# Patient Record
Sex: FEMALE | HISPANIC OR LATINO | URBAN - METROPOLITAN AREA
[De-identification: names, ages, dates, MRNs, and addresses within clinical notes are randomized per-mention and may not be internally consistent; named-entity substitution may affect disease eponyms.]

---

## 2018-12-14 ENCOUNTER — OFFICE VISIT (OUTPATIENT)
Dept: URBAN - METROPOLITAN AREA CLINIC 23 | Facility: CLINIC | Age: 58
End: 2018-12-14
Payer: COMMERCIAL

## 2018-12-14 PROCEDURE — 92014 COMPRE OPH EXAM EST PT 1/>: CPT | Performed by: OPHTHALMOLOGY

## 2018-12-14 PROCEDURE — 92134 CPTRZ OPH DX IMG PST SGM RTA: CPT | Performed by: OPHTHALMOLOGY

## 2018-12-14 ASSESSMENT — INTRAOCULAR PRESSURE
OS: 16
OD: 16

## 2018-12-14 NOTE — IMPRESSION/PLAN
Impression: Ophthalmoplegic migraine, intractable: G43. B1. Vision: affected OU. Plan: Discussed diagnosis in detail with patient. Patient complaints of migraines - increasing. Recommend a Visual Field test 30 - 2 to r/o any peripheral field changes or defects then see me for test results.  If no change or improvement with symptoms, may consider a consult with Neurologist.

## 2018-12-14 NOTE — IMPRESSION/PLAN
Impression: s/p PCA for repair of Horseshoe tear of retina without detachment, right eye 09/30/2016: H33.311. Condition: resolved with laser tx OD. Plan: Discussed diagnosis in detail with patient. Exam and OCT OD shows the retina is attached and stable. No treatment is required at this time. Will continue to observe condition and or symptoms. OCT OS is also stable.

## 2018-12-20 ENCOUNTER — OFFICE VISIT (OUTPATIENT)
Dept: URBAN - METROPOLITAN AREA CLINIC 17 | Facility: CLINIC | Age: 58
End: 2018-12-20
Payer: COMMERCIAL

## 2018-12-20 PROCEDURE — 92083 EXTENDED VISUAL FIELD XM: CPT | Performed by: OPHTHALMOLOGY

## 2018-12-20 PROCEDURE — 92134 CPTRZ OPH DX IMG PST SGM RTA: CPT | Performed by: OPHTHALMOLOGY

## 2018-12-20 PROCEDURE — 99213 OFFICE O/P EST LOW 20 MIN: CPT | Performed by: OPHTHALMOLOGY

## 2018-12-20 ASSESSMENT — INTRAOCULAR PRESSURE
OS: 15
OD: 15

## 2018-12-20 NOTE — IMPRESSION/PLAN
Impression: Ophthalmoplegic migraine, intractable: G43. B1. Vision: affected OU. Plan: Discussed diagnosis in detail with patient. VF results: normal OU and OCT is stable OU. Patient states she still experiencing migraines. Recommend a consult with Neurologist for further testing. Recommend a retina f/u in 9 mos.

## 2019-12-17 ENCOUNTER — OFFICE VISIT (OUTPATIENT)
Dept: URBAN - METROPOLITAN AREA CLINIC 23 | Facility: CLINIC | Age: 59
End: 2019-12-17
Payer: COMMERCIAL

## 2019-12-17 PROCEDURE — 99213 OFFICE O/P EST LOW 20 MIN: CPT | Performed by: OPHTHALMOLOGY

## 2019-12-17 PROCEDURE — 92134 CPTRZ OPH DX IMG PST SGM RTA: CPT | Performed by: OPHTHALMOLOGY

## 2019-12-17 ASSESSMENT — INTRAOCULAR PRESSURE
OS: 15
OD: 15

## 2019-12-17 NOTE — IMPRESSION/PLAN
Impression: s/p PCA for repair of Horseshoe tear of retina without detachment, right eye 09/30/2016: H33.311. Condition: resolved with laser tx OD. Resolved Ocular migraines Plan: Discussed diagnosis in detail with patient. Exam and OCT OD shows the retina is attached and stable. No treatment is required at this time. Will continue to observe condition and or symptoms. OCT OS is also stable. Recommend a yearly retina exam, sooner if there is a change or decrease in vision. 
Patient states she has not had any Ocular Migraine episodes, she also saw a Neurologist and test were normal.

## 2020-11-05 ENCOUNTER — OFFICE VISIT (OUTPATIENT)
Dept: URBAN - METROPOLITAN AREA CLINIC 17 | Facility: CLINIC | Age: 60
End: 2020-11-05
Payer: COMMERCIAL

## 2020-11-05 DIAGNOSIS — H35.372 PUCKERING OF MACULA, LEFT EYE: Primary | ICD-10-CM

## 2020-11-05 DIAGNOSIS — H43.811 VITREOUS DEGENERATION, RIGHT EYE: ICD-10-CM

## 2020-11-05 DIAGNOSIS — H04.123 DRY EYE SYNDROME OF BILATERAL LACRIMAL GLANDS: ICD-10-CM

## 2020-11-05 DIAGNOSIS — H25.13 AGE-RELATED NUCLEAR CATARACT, BILATERAL: ICD-10-CM

## 2020-11-05 PROCEDURE — 92004 COMPRE OPH EXAM NEW PT 1/>: CPT | Performed by: OPTOMETRIST

## 2020-11-05 ASSESSMENT — INTRAOCULAR PRESSURE
OD: 16
OS: 16

## 2020-11-05 NOTE — IMPRESSION/PLAN
Impression: s/p PCA for repair of Horseshoe tear of retina without detachment, right eye 09/30/2016: H33.311. Condition: resolved with laser tx OD. Resolved Ocular migraines Plan: Discussed diagnosis in detail with patient. Exam and OCT OD shows the retina is attached and stable. No treatment is required at this time. Will continue to observe condition and or symptoms. OCT OS is also stable. Recommend a yearly retina exam, sooner if there is a change or decrease in vision. Keep upcoming appointment

## 2020-11-05 NOTE — IMPRESSION/PLAN
Impression: Puckering of macula, left eye: H35.372. Plan: Discussed diagnosis in detail with patient. Discussed treatment options with patient. Will continue to observe condition and or symptoms. No treatment is required at this time.

## 2020-12-29 ENCOUNTER — OFFICE VISIT (OUTPATIENT)
Dept: URBAN - METROPOLITAN AREA CLINIC 17 | Facility: CLINIC | Age: 60
End: 2020-12-29
Payer: COMMERCIAL

## 2020-12-29 DIAGNOSIS — H33.311 HORSESHOE TEAR OF RETINA WITHOUT DETACHMENT, RIGHT EYE: Primary | ICD-10-CM

## 2020-12-29 PROCEDURE — 92134 CPTRZ OPH DX IMG PST SGM RTA: CPT | Performed by: OPHTHALMOLOGY

## 2020-12-29 PROCEDURE — 99213 OFFICE O/P EST LOW 20 MIN: CPT | Performed by: OPHTHALMOLOGY

## 2020-12-29 ASSESSMENT — INTRAOCULAR PRESSURE
OS: 18
OD: 18

## 2020-12-29 NOTE — IMPRESSION/PLAN
Impression: s/p PCA for repair of Horseshoe tear of retina without detachment, right eye 09/30/2016: H33.311. Condition: resolved with laser tx OD. Resolved Ocular migraines Plan: Discussed diagnosis in detail with patient. Exam and OCT OD shows the retina is attached and stable. No treatment is required at this time. Will continue to observe condition and or symptoms. OCT OS is also stable. Recommend a yearly retina exam, sooner if there is a change or decrease in vision.

## 2020-12-29 NOTE — IMPRESSION/PLAN
Impression: Ophthalmoplegic migraine, intractable: G43. B1. Vision: affected OU. Bilateral. Plan: Patient states she is having Ocular Migraine episodes once and awhile, she also saw a Neurologist and test were normal. Patient's last HVF 30-2 was in 2018 will order another HVF 30-2 to reassess her visual field and rule out any neurological conditions.

## 2020-12-31 ENCOUNTER — TESTING ONLY (OUTPATIENT)
Dept: URBAN - METROPOLITAN AREA CLINIC 17 | Facility: CLINIC | Age: 60
End: 2020-12-31
Payer: COMMERCIAL

## 2020-12-31 DIAGNOSIS — G43.B1 OPHTHALMOPLEGIC MIGRAINE, INTRACTABLE: Primary | ICD-10-CM

## 2020-12-31 PROCEDURE — 92083 EXTENDED VISUAL FIELD XM: CPT | Performed by: OPHTHALMOLOGY

## 2021-12-28 ENCOUNTER — OFFICE VISIT (OUTPATIENT)
Dept: URBAN - METROPOLITAN AREA CLINIC 17 | Facility: CLINIC | Age: 61
End: 2021-12-28
Payer: COMMERCIAL

## 2021-12-28 PROCEDURE — 92134 CPTRZ OPH DX IMG PST SGM RTA: CPT | Performed by: OPHTHALMOLOGY

## 2021-12-28 PROCEDURE — 99213 OFFICE O/P EST LOW 20 MIN: CPT | Performed by: OPHTHALMOLOGY

## 2021-12-28 ASSESSMENT — INTRAOCULAR PRESSURE
OD: 14
OS: 14

## 2021-12-28 NOTE — IMPRESSION/PLAN
Impression: s/p PCA for repair of Horseshoe tear of retina without detachment, right eye 09/30/2016: H33.311. Condition: resolved with laser tx OD. Resolved Ocular migraines Plan: Discussed diagnosis in detail with patient. Exam and OCT OD shows the retina is attached and stable. No treatment is required at this time. Will continue to observe condition and or symptoms. OCT OS is also stable. Recommend a yearly retina exam with an [employer] sooner if there is a change or decrease in vision.

## 2023-09-20 ENCOUNTER — OFFICE VISIT (OUTPATIENT)
Dept: URBAN - METROPOLITAN AREA CLINIC 17 | Facility: CLINIC | Age: 63
End: 2023-09-20
Payer: COMMERCIAL

## 2023-09-20 DIAGNOSIS — H33.311 OPERCULUM OF RETINA W/O DETACHMENT OF RIGHT EYE: Primary | ICD-10-CM

## 2023-09-20 DIAGNOSIS — H43.811 VITREOUS DEGENERATION, RIGHT EYE: ICD-10-CM

## 2023-09-20 DIAGNOSIS — H25.13 AGE-RELATED NUCLEAR CATARACT, BILATERAL: ICD-10-CM

## 2023-09-20 DIAGNOSIS — Q14.1 CONGENITAL MALFORMATION OF RETINA: ICD-10-CM

## 2023-09-20 PROCEDURE — 99214 OFFICE O/P EST MOD 30 MIN: CPT | Performed by: OPTOMETRIST

## 2023-09-20 PROCEDURE — 92134 CPTRZ OPH DX IMG PST SGM RTA: CPT | Performed by: OPTOMETRIST

## 2023-09-20 ASSESSMENT — INTRAOCULAR PRESSURE
OS: 20
OD: 18